# Patient Record
Sex: FEMALE | Race: WHITE | NOT HISPANIC OR LATINO | Employment: FULL TIME | URBAN - METROPOLITAN AREA
[De-identification: names, ages, dates, MRNs, and addresses within clinical notes are randomized per-mention and may not be internally consistent; named-entity substitution may affect disease eponyms.]

---

## 2017-11-02 ENCOUNTER — ALLSCRIPTS OFFICE VISIT (OUTPATIENT)
Dept: OTHER | Facility: OTHER | Age: 57
End: 2017-11-02

## 2017-11-23 NOTE — PROGRESS NOTES
Assessment    1  Encounter for gynecological examination without abnormal finding (V72 31) (Z01 419)    Plan  Encounter for gynecological examination without abnormal finding    · Follow-up visit in 1 year Evaluation and Treatment  Follow-up  Status: Complete  Done:02Nov2017   Ordered; For: Encounter for gynecological examination without abnormal finding; Ordered By: Mary Mariano Performed:  Due: 56ZQA0962; Last Updated By: Kylee Shelby; 11/2/2017 7:05:00 PM  Encounter for screening mammogram for malignant neoplasm of breast    · * MAMMO SCREENING BILATERAL W CAD; Status:Active; Requested for:30Nov2017;    Perform:[facility]; Marla Crenshawkj; Last Updated Emily Vang; 11/2/2017 6:48:59 PM;Ordered;for screening mammogram for malignant neoplasm of breast; Ordered By:Declan Matute; Discussion/Summary  healthy adult female the risks and benefits of cervical cancer screening were discussed cervical cancer screening is current Breast cancer screening: the risks and benefits of breast cancer screening were discussed, monthly self breast exam was advised and mammogram has been ordered  Colorectal cancer screening: the risks and benefits of colorectal cancer screening were discussed and colonoscopy has been ordered  Advice and education were given regarding weight bearing exercise, calcium supplements and vitamin D supplements  Chief Complaint  Pt is here for her yearly exam, she has no complaints  History of Present Illness  HPI: 59year old white female here for yearly exam, no complaints  She is postmenopausal and has had no bleeding  She is not sexually active as she has no partner  She has never had a colonoscopy and doesn't plan to have one either; she plans to do the Cologard test  She has not seen her PCP in years and I recommended she see them for a wellness visit and also for the Cologard test   has had a rough few weeks   Her mother was hospitalized for two weeks at which time she found out that she had colitis  Her nephew  of an overdose while her mother was in the hospital    GYN HM, Adult Female [facility]: The patient is being seen for a health maintenance evaluation  The last health maintenance visit was 1 year(s) ago  General Health: The patient's health since the last visit is described as good  Lifestyle:  She consumes a diverse and healthy diet  -- She does not have any weight concerns  -- She does not exercise regularly  -- She uses tobacco -- She denies alcohol use  -- She denies drug use  Reproductive health: the patient is postmenopausal--   she is not sexually active  -- pregnancy history: G 2P 2,-- 2    Screening: Cervical cancer screening includes a pap smear performed 2015,neg  -- and-- human papilloma virus screening performed 2015,neg  Breast cancer screening includes a mammogram performed 2016, WNL  Colorectal cancer screening includes no previous colonoscopy  Active Problems    1  Encounter for gynecological examination without abnormal finding (V72 31) (Z01 419)   2  Encounter for screening colonoscopy (V76 51) (Z12 11)   3  Encounter for screening mammogram for malignant neoplasm of breast (V76 12) (Z12 31)   4  Postmenopausal disorder (627 9) (N95 9)   5  Screening for HPV (human papillomavirus) (V73 81) (Z11 51)    Past Medical History   · History of Age At First Period 13 Years Old (Menarche)   · History of Anxiety (300 00) (F41 9)   · History of Asymptomatic menopausal state (V49 81) (Z78 0)   · History of Depression (311) (F32 9)   · Normal delivery (650) (O80,Z37  9)    Surgical History   · History of  Section    Family History  Mother    · Family history of hypertension (V17 49) (Z82 49)  Father    · Family history of hypertension (V17 49) (Z82 49)  Maternal Aunt    · Family history of Breast Cancer (V16 3)   · Family history of Pulmonary Embolism  Family History    · Family history of Arthritis (V17 7)   · Family history of Heart Disease (V17 49)   · Family history of Hypertension (V17 49)    Social History     · Denied: History of Alcohol Use (History)   · Current Smoker (305 1)   · Denied: History of Currently sexually active   · Daily Coffee Consumption (2  Cups/Day)   · Denied: History of Drug Use   · Denied: History of Exercises regularly    Current Meds   1  Venlafaxine HCl  MG Oral Capsule Extended Release 24 Hour; Therapy: 64SVN2639 to (Last Rx:11Nov2011)  Requested for: 60KZD1536 Ordered    Allergies  1  No Known Drug Allergies    Vitals   Recorded: 31PWN9800 68:26MJ   Systolic 078, LUE, Sitting   Diastolic 74, LUE, Sitting   Height 5 ft 4 in   Weight 154 lb    BMI Calculated 26 43   BSA Calculated 1 75   LMP        Physical Exam   Constitutional  General appearance: No acute distress, well appearing and well nourished  Neck  Neck: Normal, supple, trachea midline, no masses  Genitourinary  External genitalia: Normal and no lesions appreciated  Vagina: Normal, no lesions or dryness appreciated  Urethra: Normal    Urethral meatus: Normal    Bladder: Normal, soft, non-tender and no prolapse or masses appreciated  Cervix: Normal, no palpable masses  Uterus: Normal, non-tender, not enlarged, and no palpable masses  Adnexa/parametria: Normal, non-tender and no fullness or masses appreciated  Chest  Breasts: Normal and no dimpling or skin changes noted  Abdomen  Abdomen: Normal, non-tender, and no organomegaly noted  Lymphatic  Palpation of lymph nodes in neck, axillae, groin and/or other locations: No lymphadenopathy or masses noted         Future Appointments    Date/Time Provider Specialty Site   11/07/2018 08:00 AM Estrella Gonzalez [facility] Obstetrics/Gynecology [facility]       Signatures   Electronically signed by : Earnestine Edwards [facility]; Nov 2 2017  6:57PM EST                       (Author)    Electronically signed by : NEETU Manning ; Nov 22 2017 10:26AM EST                       (Author)

## 2017-11-30 DIAGNOSIS — Z12.31 ENCOUNTER FOR SCREENING MAMMOGRAM FOR MALIGNANT NEOPLASM OF BREAST: ICD-10-CM

## 2017-12-27 ENCOUNTER — HOSPITAL ENCOUNTER (OUTPATIENT)
Dept: RADIOLOGY | Age: 57
Discharge: HOME | End: 2017-12-27
Payer: COMMERCIAL

## 2017-12-27 DIAGNOSIS — Z12.31 ENCOUNTER FOR SCREENING MAMMOGRAM FOR MALIGNANT NEOPLASM OF BREAST: ICD-10-CM

## 2017-12-27 PROCEDURE — G0202 SCR MAMMO BI INCL CAD: HCPCS

## 2018-01-13 VITALS
SYSTOLIC BLOOD PRESSURE: 120 MMHG | BODY MASS INDEX: 26.29 KG/M2 | DIASTOLIC BLOOD PRESSURE: 74 MMHG | WEIGHT: 154 LBS | HEIGHT: 64 IN

## 2018-11-07 ENCOUNTER — ANNUAL EXAM (OUTPATIENT)
Dept: OBGYN CLINIC | Facility: CLINIC | Age: 58
End: 2018-11-07
Payer: COMMERCIAL

## 2018-11-07 VITALS
DIASTOLIC BLOOD PRESSURE: 88 MMHG | SYSTOLIC BLOOD PRESSURE: 144 MMHG | WEIGHT: 160 LBS | HEIGHT: 64 IN | BODY MASS INDEX: 27.31 KG/M2

## 2018-11-07 DIAGNOSIS — Z01.419 ENCNTR FOR GYN EXAM (GENERAL) (ROUTINE) W/O ABN FINDINGS: ICD-10-CM

## 2018-11-07 DIAGNOSIS — Z12.31 ENCOUNTER FOR SCREENING MAMMOGRAM FOR MALIGNANT NEOPLASM OF BREAST: ICD-10-CM

## 2018-11-07 PROCEDURE — 99396 PREV VISIT EST AGE 40-64: CPT | Performed by: PHYSICIAN ASSISTANT

## 2018-11-07 RX ORDER — CLONAZEPAM 0.5 MG/1
0.5 TABLET ORAL DAILY PRN
COMMUNITY

## 2018-11-07 RX ORDER — VENLAFAXINE HYDROCHLORIDE 75 MG/1
75 CAPSULE, EXTENDED RELEASE ORAL
COMMUNITY

## 2018-11-07 NOTE — PROGRESS NOTES
Carito Jimenez   1960    CC:  Yearly exam    S:  59 y o  female here for yearly exam  She is postmenopausal and has had no vaginal bleeding  She denies vaginal discharge, itching, odor or dryness  She is not sexually active  She still struggles emotionally with the death of her son in 2012 in a motorcycle accident [place] [address]  She is struggling with "all over body yeast" and is seeing her PCP for this; she says that her dermatologist no longer accepts her insurance and she was given the name of Dr Brandon Diane as another option  Last Pap 7/13/15 neg/neg  Last Mammo 12/27/17 neg  Last Colonoscopy never - recommended      Current Outpatient Prescriptions:     clonazePAM (KlonoPIN) 0 5 mg tablet, Take 0 5 mg by mouth daily as needed, Disp: , Rfl:     venlafaxine (EFFEXOR-XR) 75 mg 24 hr capsule, Take 75 mg by mouth, Disp: , Rfl:   Social History     Social History    Marital status: Single     Spouse name: N/A    Number of children: N/A    Years of education: N/A     Occupational History    Not on file  Social History Main Topics    Smoking status: Current Every Day Smoker    Smokeless tobacco: Never Used    Alcohol use No      Comment: (history)    Drug use: No    Sexual activity: No     Other Topics Concern    Not on file     Social History Narrative    Caffeine use- 2 cups of coffee per day    Denied exercising regularly             Family History   Problem Relation Age of Onset    Hypertension Mother     Hypertension Father     Arthritis Family     Heart disease Family     Hypertension Family     Breast cancer Maternal Aunt     Pulmonary embolism Maternal Aunt      Past Medical History:   Diagnosis Date    Anxiety     Depression         O:  Blood pressure 144/88, height 5' 4" (1 626 m), weight 72 6 kg (160 lb)      Patient appears well and is not in distress  Neck is supple without masses  Breasts are symmetrical without mass, tenderness, nipple discharge, skin changes or adenopathy  Abdomen is soft and nontender without masses  External genitals are normal without lesions or rashes  Vagina is normal without discharge or bleeding  Cervix is normal without discharge or lesion  Uterus is normal, mobile, nontender without palpable mass  Adnexa are normal, nontender, without palpable mass  A:  Yearly exam      P:   Pap 2020   Mammo slip entered   Colonoscopy recommended   RTO one year for yearly exam or sooner as needed

## 2024-02-14 ENCOUNTER — OFFICE VISIT (OUTPATIENT)
Dept: FAMILY MEDICINE CLINIC | Facility: CLINIC | Age: 64
End: 2024-02-14
Payer: COMMERCIAL

## 2024-02-14 VITALS
SYSTOLIC BLOOD PRESSURE: 140 MMHG | DIASTOLIC BLOOD PRESSURE: 90 MMHG | BODY MASS INDEX: 29.53 KG/M2 | WEIGHT: 173 LBS | HEART RATE: 112 BPM | HEIGHT: 64 IN | RESPIRATION RATE: 16 BRPM | OXYGEN SATURATION: 98 % | TEMPERATURE: 98.4 F

## 2024-02-14 DIAGNOSIS — Z12.11 COLON CANCER SCREENING: ICD-10-CM

## 2024-02-14 DIAGNOSIS — F33.2 SEVERE RECURRENT MAJOR DEPRESSION WITHOUT PSYCHOTIC FEATURES (HCC): ICD-10-CM

## 2024-02-14 DIAGNOSIS — Z00.00 ANNUAL PHYSICAL EXAM: ICD-10-CM

## 2024-02-14 DIAGNOSIS — L30.8 OTHER ECZEMA: ICD-10-CM

## 2024-02-14 DIAGNOSIS — M25.561 ACUTE PAIN OF RIGHT KNEE: Primary | ICD-10-CM

## 2024-02-14 DIAGNOSIS — Z53.20 LUNG CANCER SCREENING DECLINED BY PATIENT: ICD-10-CM

## 2024-02-14 DIAGNOSIS — Z12.4 SCREENING FOR CERVICAL CANCER: ICD-10-CM

## 2024-02-14 DIAGNOSIS — Z11.59 NEED FOR HEPATITIS C SCREENING TEST: ICD-10-CM

## 2024-02-14 DIAGNOSIS — Z13.820 OSTEOPOROSIS SCREENING: ICD-10-CM

## 2024-02-14 DIAGNOSIS — M72.0 DUPUYTREN CONTRACTURE: ICD-10-CM

## 2024-02-14 DIAGNOSIS — Z12.31 ENCOUNTER FOR SCREENING MAMMOGRAM FOR BREAST CANCER: ICD-10-CM

## 2024-02-14 DIAGNOSIS — F17.200 TOBACCO USE DISORDER: ICD-10-CM

## 2024-02-14 DIAGNOSIS — Z11.4 SCREENING FOR HIV (HUMAN IMMUNODEFICIENCY VIRUS): ICD-10-CM

## 2024-02-14 PROCEDURE — 99204 OFFICE O/P NEW MOD 45 MIN: CPT | Performed by: FAMILY MEDICINE

## 2024-02-14 PROCEDURE — 99386 PREV VISIT NEW AGE 40-64: CPT | Performed by: FAMILY MEDICINE

## 2024-02-14 RX ORDER — CEPHALEXIN 500 MG/1
500 CAPSULE ORAL EVERY 8 HOURS SCHEDULED
Qty: 21 CAPSULE | Refills: 0 | Status: SHIPPED | OUTPATIENT
Start: 2024-02-14 | End: 2024-02-21

## 2024-02-14 RX ORDER — FLUOCINOLONE ACETONIDE 0.1 MG/ML
SOLUTION TOPICAL 2 TIMES DAILY
Qty: 90 ML | Refills: 0 | Status: SHIPPED | OUTPATIENT
Start: 2024-02-14

## 2024-02-14 NOTE — ASSESSMENT & PLAN NOTE
Used to see Dr. Sanchez for over 30 years  And she is retiring   I will manage her meds moving forward as she has been doing well with them

## 2024-02-14 NOTE — PROGRESS NOTES
ADULT ANNUAL PHYSICAL  [facility]    NAME: Sharon Olsen  AGE: 63 y.o. 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 Sanchez for over 30 years  And she is retiring   I will manage her meds moving forward as she has been doing well with them            Tobacco use disorder     Not ready to quit yet  Smoking cessation done today          Lung cancer screening declined by patient   Other Visit Diagnoses     Acute pain of right knee    -  Primary    started 2 weeks ago after falling over ice   took ibuprofen which took the edge off    Relevant Orders    XR knee 4+ vw right injury    Need for hepatitis C screening test        Relevant Orders    Hepatitis C antibody    Screening for HIV (human immunodeficiency virus)        Relevant Orders    HIV 1/2 AG/AB w Reflex [facility] for 2 yr old and above    Screening for cervical cancer        Relevant Orders    Ambulatory Referral to Obstetrics / Gynecology    Encounter for screening mammogram for breast cancer        Relevant Orders    Mammo screening bilateral w 3d & cad    Osteoporosis screening        Relevant Orders    DXA bone density spine hip and pelvis    Colon cancer screening        Relevant Orders    Cologuard    Annual physical exam        Relevant Orders    CBC and differential    Comprehensive metabolic panel    Lipid panel    TSH, 3rd generation with Free T4 reflex    Other eczema        Relevant Medications    fluocinolone (SYNALAR) 0.01 % external solution    cephalexin (KEFLEX) 500 mg capsule    Dupuytren contracture        Relevant Orders    Ambulatory referral to Orthopedic Surgery          Immunizations and preventive care screenings were discussed with patient today. Appropriate education was printed on patient's after visit  summary.    Counseling:  Alcohol/drug use: discussed moderation in alcohol intake, the recommendations for healthy alcohol use, and avoidance of illicit drug use.  Dental Health: discussed importance of regular tooth brushing, flossing, and dental visits.  Injury prevention: discussed safety/seat belts, safety helmets, smoke detectors, carbon dioxide detectors, and smoking near bedding or upholstery.  Sexual health: discussed sexually transmitted diseases, partner selection, use of condoms, avoidance of unintended pregnancy, and contraceptive alternatives.  Exercise: the importance of regular exercise/physical activity was discussed. Recommend exercise 3-5 times per week for at least 30 minutes.       Tobacco Cessation Counseling: Tobacco cessation counseling was provided. The patient is sincerely urged to quit consumption of tobacco. She is not ready to quit tobacco. Medication options and side effects of medication discussed. Patient refused medication.         No follow-ups on file.     Chief Complaint:     Chief Complaint   Patient presents with   • New Patient Visit     Patient being seen for New Patient visit-establish care   • Knee Pain     Patient is being seen for R knee x 2 weeks ago. Patient states she fell on ice.    • Earache     Patient is being seen for bilateral ear pain x a couple year. Patient notes itchy, red ears.       History of Present Illness:     Adult Annual Physical   Patient here for a comprehensive physical exam. New patient . The patient reports   New Patient Visit (Patient being seen for New Patient visit-establish care)  Knee Pain (Patient is being seen for R knee x 2 weeks ago. Patient states she fell on ice 2 weeks ago)  Earache (Patient is being seen for bilateral ear pain x a couple year. Patient notes itchy, red ears. ).       Hard to stretch  right knee all the way since the fall . Moving in different directions make it worse. Walking is okay   Her psychiatrist of over 30 years  Is retiring   Works at [employer] as      Diet and Physical Activity  Diet/Nutrition: well balanced diet and consuming 3-5 servings of fruits/vegetables daily.   Exercise: walking.      Depression Screening  PHQ-2/9 Depression Screening    Little interest or pleasure in doing things: 0 - not at all  Feeling down, depressed, or hopeless: 0 - not at all  PHQ-2 Score: 0  PHQ-2 Interpretation: Negative depression screen       General Health  Sleep: sleeps well and gets 7-8 hours of sleep on average.   Hearing: normal - bilateral.  Vision: goes for regular eye exams.   Dental: regular dental visits and brushes teeth twice daily.       /GYN Health  Follows with gynecology? yes   Patient is: postmenopausal  Last menstrual period: 20 years ago   Contraceptive method:  none .    Advanced Care Planning  Do you have an advanced directive? no  Do you have a durable medical power of ? no  ACP document given to the patient? no     Review of Systems:     Review of Systems   Constitutional: Negative.  Negative for fever.   HENT:  Positive for ear pain. Negative for facial swelling, hearing loss, nosebleeds, postnasal drip, sinus pain, tinnitus, trouble swallowing and voice change.    Eyes: Negative.    Respiratory: Negative.     Cardiovascular: Negative.    Gastrointestinal: Negative.    Endocrine: Negative.    Genitourinary: Negative.    Musculoskeletal:  Positive for arthralgias.   Skin:  Positive for color change and rash.   Allergic/Immunologic: Negative.    Neurological: Negative.    Hematological: Negative.    Psychiatric/Behavioral: Negative.        Past Medical History:     Past Medical History:   Diagnosis Date   • Anxiety    • Depression       Past Surgical History:     Past Surgical History:   Procedure Laterality Date   •  SECTION  1987      Social History:     Social History     Socioeconomic History   • Marital status: Single     Spouse name: None   • Number of children: None    • Years of education: None   • Highest education level: None   Occupational History   • None   Tobacco Use   • Smoking status: Some Days     Current packs/day: 0.25     Average packs/day: 0.3 packs/day for 25.1 years (6.3 ttl pk-yrs)     Types: Cigarettes     Start date: 1999   • Smokeless tobacco: Never   Vaping Use   • Vaping status: Never Used   Substance and Sexual Activity   • Alcohol use: Yes     Comment: social   • Drug use: No   • Sexual activity: Never   Other Topics Concern   • None   Social History Narrative    Caffeine use- 2 cups of coffee per day    Denied exercising regularly         Social Determinants of Health     Financial Resource Strain: Not on file   Food Insecurity: Not on file   Transportation Needs: Not on file   Physical Activity: Not on file   Stress: Not on file   Social Connections: Not on file   Intimate Partner Violence: Not on file   Housing Stability: Not on file      Family History:     Family History   Problem Relation Age of Onset   • Hypertension Mother    • Hypertension Father    • No Known Problems Sister    • No Known Problems Sister    • Heart disease Brother    • Clotting disorder Maternal Grandmother    • No Known Problems Maternal Grandfather    • No Known Problems Paternal Grandmother    • No Known Problems Paternal Grandfather    • Breast cancer Maternal Aunt    • Pulmonary embolism Maternal Aunt    • Arthritis Family    • Heart disease Family    • Hypertension Family       Current Medications:     Current Outpatient Medications   Medication Sig Dispense Refill   • cephalexin (KEFLEX) 500 mg capsule Take 1 capsule (500 mg total) by mouth every 8 (eight) hours for 7 days 21 capsule 0   • clonazePAM (KlonoPIN) 0.5 mg tablet Take 0.5 mg by mouth daily as needed     • fluocinolone (SYNALAR) 0.01 % external solution Apply topically 2 (two) times a day 90 mL 0   • venlafaxine (EFFEXOR-XR) 75 mg 24 hr capsule Take 75 mg by mouth       No current facility-administered  "medications for this visit.      Allergies:     No Known Allergies   Physical Exam:     /90 (BP Location: Left arm, Patient Position: Sitting, Cuff Size: Standard)   Pulse (!) 112   Temp 98.4 °F (36.9 °C) (Tympanic)   Resp 16   Ht 5' 4.02\" (1.626 m)   Wt 78.5 kg (173 lb)   SpO2 98%   BMI 29.68 kg/m²     Physical Exam  Vitals and nursing note reviewed.   Constitutional:       Appearance: Normal appearance.   HENT:      Head: Normocephalic.      Right Ear: Tympanic membrane normal.      Left Ear: Tympanic membrane normal.      Nose: Nose normal.      Mouth/Throat:      Mouth: Mucous membranes are moist.   Cardiovascular:      Rate and Rhythm: Normal rate and regular rhythm.   Pulmonary:      Effort: Pulmonary effort is normal.      Breath sounds: Normal breath sounds.   Abdominal:      General: Abdomen is flat.   Musculoskeletal:         General: Tenderness present.      Comments: Right medial knee   Skin:     General: Skin is warm.      Findings: Erythema and rash present.      Comments: Left posterior ear   Neurological:      General: No focal deficit present.      Mental Status: She is alert and oriented to person, place, and time.   Psychiatric:         Mood and Affect: Mood normal.         Behavior: Behavior normal.                Rosalie Connolly MD  [facility]    "

## 2024-02-19 ENCOUNTER — HOSPITAL ENCOUNTER (OUTPATIENT)
Dept: RADIOLOGY | Age: 64
Discharge: HOME | End: 2024-02-19
Payer: COMMERCIAL

## 2024-02-19 VITALS — HEIGHT: 64 IN | BODY MASS INDEX: 29.53 KG/M2 | WEIGHT: 173 LBS

## 2024-02-19 DIAGNOSIS — Z12.31 ENCOUNTER FOR SCREENING MAMMOGRAM FOR BREAST CANCER: ICD-10-CM

## 2024-02-19 PROCEDURE — 77063 BREAST TOMOSYNTHESIS BI: CPT

## 2024-02-19 PROCEDURE — 77067 SCR MAMMO BI INCL CAD: CPT

## 2024-02-20 ENCOUNTER — HOSPITAL ENCOUNTER (OUTPATIENT)
Dept: RADIOLOGY | Facility: HOSPITAL | Age: 64
Discharge: HOME | End: 2024-02-20
Payer: COMMERCIAL

## 2024-02-20 DIAGNOSIS — M25.561 ACUTE PAIN OF RIGHT KNEE: ICD-10-CM

## 2024-02-20 PROCEDURE — 73564 X-RAY EXAM KNEE 4 OR MORE: CPT

## 2024-02-21 ENCOUNTER — TELEPHONE (OUTPATIENT)
Dept: FAMILY MEDICINE CLINIC | Facility: CLINIC | Age: 64
End: 2024-02-21

## 2024-02-21 NOTE — TELEPHONE ENCOUNTER
----- Message from Rosalie Connolly MD sent at 2/21/2024  7:36 AM EST -----  Normal right knee X ray    Dr connolly

## 2024-03-01 LAB
ALBUMIN SERPL-MCNC: 4.4 G/DL (ref 3.6–5.1)
ALBUMIN/GLOB SERPL: 1.8 (CALC) (ref 1–2.5)
ALP SERPL-CCNC: 110 U/L (ref 37–153)
ALT SERPL-CCNC: 12 U/L (ref 6–29)
AST SERPL-CCNC: 14 U/L (ref 10–35)
BASOPHILS # BLD AUTO: 139 CELLS/UL (ref 0–200)
BASOPHILS NFR BLD AUTO: 2.4 %
BILIRUB SERPL-MCNC: 0.7 MG/DL (ref 0.2–1.2)
BUN SERPL-MCNC: 11 MG/DL (ref 7–25)
BUN/CREAT SERPL: NORMAL (CALC) (ref 6–22)
CALCIUM SERPL-MCNC: 9.7 MG/DL (ref 8.6–10.4)
CHLORIDE SERPL-SCNC: 105 MMOL/L (ref 98–110)
CHOLEST SERPL-MCNC: 273 MG/DL
CHOLEST/HDLC SERPL: 4.8 (CALC)
CO2 SERPL-SCNC: 29 MMOL/L (ref 20–32)
CREAT SERPL-MCNC: 0.74 MG/DL (ref 0.5–1.05)
EOSINOPHIL # BLD AUTO: 354 CELLS/UL (ref 15–500)
EOSINOPHIL NFR BLD AUTO: 6.1 %
ERYTHROCYTE [DISTWIDTH] IN BLOOD BY AUTOMATED COUNT: 12.7 % (ref 11–15)
GFR/BSA.PRED SERPLBLD CYS-BASED-ARV: 90 ML/MIN/1.73M2
GLOBULIN SER CALC-MCNC: 2.5 G/DL (CALC) (ref 1.9–3.7)
GLUCOSE SERPL-MCNC: 98 MG/DL (ref 65–99)
HCT VFR BLD AUTO: 43.1 % (ref 35–45)
HCV AB SERPL QL IA: NORMAL
HDLC SERPL-MCNC: 57 MG/DL
HGB BLD-MCNC: 14.7 G/DL (ref 11.7–15.5)
HIV 1+2 AB+HIV1 P24 AG SERPL QL IA: NORMAL
LDLC SERPL CALC-MCNC: 184 MG/DL (CALC)
LYMPHOCYTES # BLD AUTO: 1218 CELLS/UL (ref 850–3900)
LYMPHOCYTES NFR BLD AUTO: 21 %
MCH RBC QN AUTO: 30.7 PG (ref 27–33)
MCHC RBC AUTO-ENTMCNC: 34.1 G/DL (ref 32–36)
MCV RBC AUTO: 90 FL (ref 80–100)
MONOCYTES # BLD AUTO: 394 CELLS/UL (ref 200–950)
MONOCYTES NFR BLD AUTO: 6.8 %
NEUTROPHILS # BLD AUTO: 3695 CELLS/UL (ref 1500–7800)
NEUTROPHILS NFR BLD AUTO: 63.7 %
NONHDLC SERPL-MCNC: 216 MG/DL (CALC)
PLATELET # BLD AUTO: 284 THOUSAND/UL (ref 140–400)
PMV BLD REES-ECKER: 11.2 FL (ref 7.5–12.5)
POTASSIUM SERPL-SCNC: 4.5 MMOL/L (ref 3.5–5.3)
PROT SERPL-MCNC: 6.9 G/DL (ref 6.1–8.1)
RBC # BLD AUTO: 4.79 MILLION/UL (ref 3.8–5.1)
SODIUM SERPL-SCNC: 139 MMOL/L (ref 135–146)
TRIGL SERPL-MCNC: 175 MG/DL
TSH SERPL-ACNC: 1.06 MIU/L (ref 0.4–4.5)
WBC # BLD AUTO: 5.8 THOUSAND/UL (ref 3.8–10.8)

## 2024-03-05 ENCOUNTER — HOSPITAL ENCOUNTER (OUTPATIENT)
Dept: RADIOLOGY | Facility: IMAGING CENTER | Age: 64
Discharge: HOME | End: 2024-03-05
Payer: COMMERCIAL

## 2024-03-05 DIAGNOSIS — Z13.820 OSTEOPOROSIS SCREENING: ICD-10-CM

## 2024-03-05 PROCEDURE — 77080 DXA BONE DENSITY AXIAL: CPT

## 2024-03-19 ENCOUNTER — OFFICE VISIT (OUTPATIENT)
Dept: OBGYN CLINIC | Facility: CLINIC | Age: 64
End: 2024-03-19
Payer: COMMERCIAL

## 2024-03-19 VITALS — BODY MASS INDEX: 29.53 KG/M2 | HEIGHT: 64 IN | WEIGHT: 173 LBS

## 2024-03-19 DIAGNOSIS — M72.0 DUPUYTREN'S DISEASE: Primary | ICD-10-CM

## 2024-03-19 PROCEDURE — 99203 OFFICE O/P NEW LOW 30 MIN: CPT | Performed by: STUDENT IN AN ORGANIZED HEALTH CARE EDUCATION/TRAINING PROGRAM

## 2024-03-19 NOTE — PROGRESS NOTES
ORTHOPAEDIC HAND, WRIST, AND ELBOW OFFICE  VISIT      ASSESSMENT/PLAN:      Diagnoses and all orders for this visit:    Dupuytren's disease  -     Ambulatory Referral to PT/OT Hand Therapy; Future          63 y.o. female with bilateral hand dupuytren's diease   Treatment options and expected outcomes were discussed.  The patient verbalized understanding of exam findings and treatment plan.   The patient was given the opportunity to ask questions.  Questions were answered to the patient's satisfaction.  Discussed with the patient that typically we can consider treatment options such as Xiaflex or surgery once the patient has a 30 degree contracture at MCP joint or any degree of contracture of PIP joint  Patient stated she is not very symptomatic from PIP joint and would like to continue noninvasive treatment  She does not have any contractures on the right.  Also discussed we can consider a referral to formal therapy for custom bracing.   The patient decided to move forward with therapy and custom bracing.  A referral was provided for a left small finger PIP splint she can wear at night.  The patient was instructed to monitor her contractures and if these worsen to follow up in the office.  She may follow up with me as needed.      Follow Up:  PRN       To Do Next Visit:         Discussions:  Dupuytren's Disease:  The anatomy and physiology of Dupuytren's disease were discussed with the patient today in the office.  Increased collagen formation (similar to scar tissue formation but without injury) within the interval between the skin volarly and the flexor tendons dorsally can result in pit formation, nodular formation, or eventual cord formation.  These pathologic cords can cause contracture at either the metacarpophalangeal joint, proximal interphalangeal joint, or both.  As the cords progress towards the proximal interphalangeal joint, the neurovascular structures of the finger may become involved within the  disease process.  While this is a genetic condition with variable penetrance, repetitive micro trauma may trigger the development of cord formation and thus contracture.  Conservative treatment options including therapy to maintain joint mobility and tabletop testing were discussed.  Other treatments include Xiaflex (collagenase) injection and surgical excision of abnormal cords.       Kameron Ventura MD  Attending, Orthopaedic Surgery  Hand, Wrist, and Elbow Surgery  [facility]    ______________________________________________________________________________________________    CHIEF COMPLAINT:  Chief Complaint   Patient presents with   • Left Hand - Pain       SUBJECTIVE:  Patient is a 63 y.o. RHD female who presents today for evaluation and treatment of bilateral hand Dupuytren's disease. The patient notes cords to both of her palms. She states the left is worse than her right. She states this has been ongoing for the past 5 years. She denies any pain. She states this does not interfere with ADLs. The patient is referred by Rosalie Connolly MD.      Occupation:  [employer]      I have personally reviewed all the relevant PMH, PSH, SH, FH, Medications and allergies      PAST MEDICAL HISTORY:  Past Medical History:   Diagnosis Date   • Anxiety    • Depression        PAST SURGICAL HISTORY:  Past Surgical History:   Procedure Laterality Date   • BREAST EXCISIONAL BIOPSY Left     benign   •  SECTION  1987       FAMILY HISTORY:  Family History   Problem Relation Age of Onset   • Hypertension Mother    • Hypertension Father    • Prostate cancer Father 50   • Heart attack Father 52   • No Known Problems Sister    • No Known Problems Sister    • No Known Problems Daughter    • Clotting disorder Maternal Grandmother    • No Known Problems Maternal Grandfather    • No Known Problems Paternal Grandmother    • No Known Problems Paternal Grandfather    • Heart disease  "Brother    • Cancer Brother         spleen   • Breast cancer Maternal Aunt 60   • Pulmonary embolism Maternal Aunt    • No Known Problems Maternal Aunt    • No Known Problems Maternal Aunt    • No Known Problems Maternal Aunt    • Arthritis Family    • Heart disease Family    • Hypertension Family    • Heart disease Paternal Aunt    • No Known Problems Paternal Aunt    • No Known Problems Paternal Aunt        SOCIAL HISTORY:  Social History     Tobacco Use   • Smoking status: Some Days     Current packs/day: 0.25     Average packs/day: 0.3 packs/day for 25.2 years (6.3 ttl pk-yrs)     Types: Cigarettes     Start date: 1999   • Smokeless tobacco: Never   Vaping Use   • Vaping status: Never Used   Substance Use Topics   • Alcohol use: Yes     Comment: social   • Drug use: No       MEDICATIONS:    Current Outpatient Medications:   •  clonazePAM (KlonoPIN) 0.5 mg tablet, Take 0.5 mg by mouth daily as needed, Disp: , Rfl:   •  fluocinolone (SYNALAR) 0.01 % external solution, Apply topically 2 (two) times a day, Disp: 90 mL, Rfl: 0  •  venlafaxine (EFFEXOR-XR) 75 mg 24 hr capsule, Take 75 mg by mouth, Disp: , Rfl:     ALLERGIES:  No Known Allergies        REVIEW OF SYSTEMS:  Musculoskeletal:        As noted in HPI.   All other systems reviewed and are negative.    VITALS:  There were no vitals filed for this visit.    LABS:  HgA1c: No results found for: \"HGBA1C\"  BMP:   Lab Results   Component Value Date    CALCIUM 9.7 03/01/2024    K 4.5 03/01/2024    CO2 29 03/01/2024     03/01/2024    BUN 11 03/01/2024    CREATININE 0.74 03/01/2024       _____________________________________________________  PHYSICAL EXAMINATION:  General: Well developed and well nourished, alert & oriented x 3, appears comfortable  Psychiatric: Normal  HEENT: Normocephalic, Atraumatic Trachea Midline, No torticollis  Pulmonary: No audible wheezing or respiratory distress   Abdomen/GI: Non tender, non distended   Cardiovascular: No pitting edema, " 2+ radial pulse   Skin: No masses, erythema, lacerations, fluctation, ulcerations  Neurovascular: Sensation Intact to the Median, Ulnar, Radial Nerve, Motor Intact to the Median, Ulnar, Radial Nerve, and Pulses Intact  Musculoskeletal: Normal, except as noted in detailed exam and in HPI.      MUSCULOSKELETAL EXAMINATION:  Right hand  Dupuytren's cords without contractures    Left hand  Long and ring finger dupuytren's cords in the palm  Long MCP 5 shy full extension   Ring MCP 10 shy full extension  Small finger PIP 15 shy   ___________________________________________________  STUDIES REVIEWED:  PCP note reviewed   CMP from 3/1/24 reviewed        PROCEDURES PERFORMED:  Procedures  No Procedures performed today    _____________________________________________________      Scribe Attestation    I,:  Gayathri Morgan MA am acting as a scribe while in the presence of the attending physician.:       I,:  Kameron Ventura MD personally performed the services described in this documentation    as scribed in my presence.:

## 2024-03-29 ENCOUNTER — OFFICE VISIT (OUTPATIENT)
Dept: OBGYN CLINIC | Facility: CLINIC | Age: 64
End: 2024-03-29
Payer: COMMERCIAL

## 2024-03-29 VITALS
BODY MASS INDEX: 29.4 KG/M2 | SYSTOLIC BLOOD PRESSURE: 140 MMHG | HEIGHT: 64 IN | WEIGHT: 172.2 LBS | DIASTOLIC BLOOD PRESSURE: 82 MMHG

## 2024-03-29 DIAGNOSIS — Z01.419 WELL WOMAN EXAM WITH ROUTINE GYNECOLOGICAL EXAM: Primary | ICD-10-CM

## 2024-03-29 DIAGNOSIS — Z12.4 ENCOUNTER FOR SCREENING FOR MALIGNANT NEOPLASM OF CERVIX: ICD-10-CM

## 2024-03-29 DIAGNOSIS — Z12.4 SCREENING FOR CERVICAL CANCER: ICD-10-CM

## 2024-03-29 DIAGNOSIS — Z12.31 ENCOUNTER FOR SCREENING MAMMOGRAM FOR MALIGNANT NEOPLASM OF BREAST: ICD-10-CM

## 2024-03-29 DIAGNOSIS — Z11.51 SCREENING FOR HPV (HUMAN PAPILLOMAVIRUS): ICD-10-CM

## 2024-03-29 PROCEDURE — G0145 SCR C/V CYTO,THINLAYER,RESCR: HCPCS

## 2024-03-29 PROCEDURE — S0610 ANNUAL GYNECOLOGICAL EXAMINA: HCPCS

## 2024-03-29 PROCEDURE — G0476 HPV COMBO ASSAY CA SCREEN: HCPCS

## 2024-03-29 NOTE — PROGRESS NOTES
ASSESSMENT & PLAN:   Diagnoses and all orders for this visit:    Well woman exam with routine gynecological exam  -     Liquid-based pap, screening    Encounter for screening for malignant neoplasm of cervix  -     Liquid-based pap, screening    Screening for HPV (human papillomavirus)  -     Liquid-based pap, screening    Encounter for screening mammogram for malignant neoplasm of breast  -     Mammo screening bilateral w 3d & cad; Future    Screening for cervical cancer  -     Ambulatory Referral to Obstetrics / Gynecology      The following were reviewed in today's visit: ASCCP guidelines (Pap screen not indicated after age 65), STD testing breast self exam, mammography screening ordered, STD testing, menopause, exercise, and healthy diet.    Patient to return to office in yearly for annual exam.     All questions have been answered to her satisfaction.    CC:  Annual Gynecologic Examination  Chief Complaint   Patient presents with    Gynecologic Exam     Last pap 2015 neg pap/ neg hpv   Last mammo 2024-Negative   Last DXA 2024 -Low bone density   Cologuard kit ordered on 2024        HPI: Sharon Olsen is a 63 y.o.  who presents for annual gynecologic examination.  She has the following concerns: none.      Health Maintenance:    Exercise: never  Breast exams/breast awareness: yes  Last mammogram: 2024, [facility]  Colorectal cancer screening: N/A, patient had cologuard ordered by PCP on 24  DEXA: 3/5/24    Past Medical History:   Diagnosis Date    Anxiety     Depression        Past Surgical History:   Procedure Laterality Date    BREAST EXCISIONAL BIOPSY Left     benign     SECTION  1987       Past OB/Gyn History:   No LMP recorded. Patient is postmenopausal.    Patient is menopausal. Since 41 y/o.  Menopausal symptoms: None  Last Pap: 2015 : no abnormalities  History of abnormal Pap smear: yes - Had a colposcopy ~35 years ago    Patient is not  currently sexually active.     Family History  Family History   Problem Relation Age of Onset    Hypertension Mother     Hypertension Father     Prostate cancer Father 50    Heart attack Father 52    No Known Problems Sister     No Known Problems Sister     No Known Problems Daughter     Clotting disorder Maternal Grandmother     No Known Problems Maternal Grandfather     No Known Problems Paternal Grandmother     No Known Problems Paternal Grandfather     Heart disease Brother     Cancer Brother         spleen    Breast cancer Maternal Aunt 60    Pulmonary embolism Maternal Aunt     No Known Problems Maternal Aunt     No Known Problems Maternal Aunt     No Known Problems Maternal Aunt     Arthritis Family     Heart disease Family     Hypertension Family     Heart disease Paternal Aunt     No Known Problems Paternal Aunt     No Known Problems Paternal Aunt      Family history of uterine or ovarian cancer: no  Family history of breast cancer: yes, maternal aunt   Family history of colon cancer: no    Social History:  Social History     Socioeconomic History    Marital status: Single     Spouse name: Not on file    Number of children: Not on file    Years of education: Not on file    Highest education level: Not on file   Occupational History    Not on file   Tobacco Use    Smoking status: Some Days     Current packs/day: 0.25     Average packs/day: 0.3 packs/day for 25.2 years (6.3 ttl pk-yrs)     Types: Cigarettes     Start date: 1999    Smokeless tobacco: Never   Vaping Use    Vaping status: Never Used   Substance and Sexual Activity    Alcohol use: Yes     Comment: social    Drug use: No    Sexual activity: Not Currently     Partners: Male     Birth control/protection: Post-menopausal   Other Topics Concern    Not on file   Social History Narrative    Caffeine use- 2 cups of coffee per day    Denied exercising regularly         Social Determinants of Health     Financial Resource Strain: Not on file   Food  "Insecurity: Not on file   Transportation Needs: Not on file   Physical Activity: Not on file   Stress: Not on file   Social Connections: Not on file   Intimate Partner Violence: Not on file   Housing Stability: Not on file     Domestic violence screen: negative    Allergies:  No Known Allergies    Medications:    Current Outpatient Medications:     clonazePAM (KlonoPIN) 0.5 mg tablet, Take 0.5 mg by mouth daily as needed, Disp: , Rfl:     fluocinolone (SYNALAR) 0.01 % external solution, Apply topically 2 (two) times a day, Disp: 90 mL, Rfl: 0    venlafaxine (EFFEXOR-XR) 75 mg 24 hr capsule, Take 75 mg by mouth, Disp: , Rfl:     Review of Systems:  Review of Systems   Constitutional:  Negative for chills and fever.   Respiratory:  Negative for shortness of breath.    Cardiovascular:  Negative for chest pain.   Gastrointestinal:  Negative for abdominal pain, constipation and diarrhea.   Genitourinary:  Negative for dysuria, frequency, pelvic pain, vaginal discharge and vaginal pain.   Psychiatric/Behavioral:  Negative for self-injury and suicidal ideas.        Physical Exam:  /82 (BP Location: Right arm, Patient Position: Sitting, Cuff Size: Standard)   Ht 5' 4.02\" (1.626 m)   Wt 78.1 kg (172 lb 3.2 oz)   BMI 29.54 kg/m²    Physical Exam  Constitutional:       Appearance: Normal appearance.   Genitourinary:      Vulva and urethral meatus normal.      No labial fusion noted.      No vaginal erythema or tenderness.        Right Adnexa: not tender.     Left Adnexa: not tender.     No cervical discharge or friability.      Uterus is not tender.   Breasts:     Breasts are symmetrical.      Right: Normal.      Left: Normal.   HENT:      Head: Normocephalic and atraumatic.   Neck:      Thyroid: No thyroid mass or thyroid tenderness.   Cardiovascular:      Rate and Rhythm: Normal rate and regular rhythm.      Heart sounds: Normal heart sounds. No murmur heard.  Pulmonary:      Effort: Pulmonary effort is normal.      " Breath sounds: Normal breath sounds. No wheezing.   Abdominal:      Palpations: Abdomen is soft. There is no mass.      Tenderness: There is no abdominal tenderness.   Lymphadenopathy:      Cervical: No cervical adenopathy.   Neurological:      General: No focal deficit present.      Mental Status: She is alert and oriented to person, place, and time.   Skin:     General: Skin is warm and dry.   Psychiatric:         Mood and Affect: Mood normal.         Behavior: Behavior normal.   Vitals and nursing note reviewed.

## 2024-04-01 LAB
HPV HR 12 DNA CVX QL NAA+PROBE: NEGATIVE
HPV16 DNA CVX QL NAA+PROBE: NEGATIVE
HPV18 DNA CVX QL NAA+PROBE: NEGATIVE

## 2024-04-07 LAB
LAB AP GYN PRIMARY INTERPRETATION: NORMAL
Lab: NORMAL

## 2024-05-28 ENCOUNTER — RA CDI HCC (OUTPATIENT)
Dept: OTHER | Facility: HOSPITAL | Age: 64
End: 2024-05-28

## 2024-06-05 ENCOUNTER — OFFICE VISIT (OUTPATIENT)
Dept: FAMILY MEDICINE CLINIC | Facility: CLINIC | Age: 64
End: 2024-06-05
Payer: COMMERCIAL

## 2024-06-05 VITALS
BODY MASS INDEX: 29.02 KG/M2 | HEIGHT: 64 IN | HEART RATE: 83 BPM | OXYGEN SATURATION: 98 % | WEIGHT: 170 LBS | TEMPERATURE: 97.3 F | SYSTOLIC BLOOD PRESSURE: 130 MMHG | RESPIRATION RATE: 17 BRPM | DIASTOLIC BLOOD PRESSURE: 80 MMHG

## 2024-06-05 DIAGNOSIS — F33.2 SEVERE RECURRENT MAJOR DEPRESSION WITHOUT PSYCHOTIC FEATURES (HCC): Primary | ICD-10-CM

## 2024-06-05 DIAGNOSIS — Z53.20 LUNG CANCER SCREENING DECLINED BY PATIENT: ICD-10-CM

## 2024-06-05 DIAGNOSIS — E78.2 MIXED HYPERLIPIDEMIA: ICD-10-CM

## 2024-06-05 DIAGNOSIS — F17.200 TOBACCO USE DISORDER: ICD-10-CM

## 2024-06-05 DIAGNOSIS — M85.89 OSTEOPENIA OF MULTIPLE SITES: ICD-10-CM

## 2024-06-05 PROCEDURE — 99214 OFFICE O/P EST MOD 30 MIN: CPT | Performed by: FAMILY MEDICINE

## 2024-06-05 RX ORDER — VENLAFAXINE HYDROCHLORIDE 75 MG/1
225 CAPSULE, EXTENDED RELEASE ORAL DAILY
Qty: 270 CAPSULE | Refills: 3 | Status: SHIPPED | OUTPATIENT
Start: 2024-06-05

## 2024-06-05 RX ORDER — ATORVASTATIN CALCIUM 10 MG/1
10 TABLET, FILM COATED ORAL DAILY
Qty: 100 TABLET | Refills: 3 | Status: SHIPPED | OUTPATIENT
Start: 2024-06-05

## 2024-06-05 NOTE — PROGRESS NOTES
Ambulatory Visit  Name: Sharon Olsen      : 1960      MRN: [id number]  Encounter Provider: Rosalie Connolly MD  Encounter Date: 2024   Encounter department: [facility]    Assessment & Plan   1. Severe recurrent major depression without psychotic features (HCC)  Assessment & Plan:  Stable on current meds    Orders:  -     venlafaxine (EFFEXOR-XR) 75 mg 24 hr capsule; Take 3 capsules (225 mg total) by mouth daily  2. Lung cancer screening declined by patient  3. Tobacco use disorder  Assessment & Plan:  Not ready to quit yet  Counseling given today  4. Mixed hyperlipidemia  Assessment & Plan:  ASCVD risk 12.1%  Trial of atorvastatin   Orders:  -     atorvastatin (LIPITOR) 10 mg tablet; Take 1 tablet (10 mg total) by mouth daily  -     Lipid panel  -     Comprehensive metabolic panel  5. Osteopenia of multiple sites  Assessment & Plan:  Dexa scan done in 3/2024  Continue calcium and vitamin D           History of Present Illness     HPI  Here for follow up  Feels well overall  No side effects from the medications       Review of Systems   Constitutional: Negative.    HENT: Negative.     Eyes: Negative.    Respiratory: Negative.     Cardiovascular: Negative.    Gastrointestinal: Negative.    Endocrine: Negative.    Genitourinary: Negative.    Musculoskeletal: Negative.    Skin: Negative.    Allergic/Immunologic: Negative.    Neurological: Negative.    Hematological: Negative.    Psychiatric/Behavioral: Negative.       Past Medical History:   Diagnosis Date   • Anxiety    • Depression      Past Surgical History:   Procedure Laterality Date   • BREAST EXCISIONAL BIOPSY Left     benign   •  SECTION  1987     Family History   Problem Relation Age of Onset   • Hypertension Mother    • Hypertension Father    • Prostate cancer Father 50   • Heart attack Father 52   • No Known Problems Sister    • No Known Problems Sister    • No Known Problems Daughter    • Clotting disorder  "Maternal Grandmother    • No Known Problems Maternal Grandfather    • No Known Problems Paternal Grandmother    • No Known Problems Paternal Grandfather    • Heart disease Brother    • Cancer Brother         spleen   • Breast cancer Maternal Aunt 60   • Pulmonary embolism Maternal Aunt    • No Known Problems Maternal Aunt    • No Known Problems Maternal Aunt    • No Known Problems Maternal Aunt    • Arthritis Family    • Heart disease Family    • Hypertension Family    • Heart disease Paternal Aunt    • No Known Problems Paternal Aunt    • No Known Problems Paternal Aunt      Social History     Tobacco Use   • Smoking status: Some Days     Current packs/day: 0.25     Average packs/day: 0.3 packs/day for 25.4 years (6.4 ttl pk-yrs)     Types: Cigarettes     Start date: 1999   • Smokeless tobacco: Never   Vaping Use   • Vaping status: Never Used   Substance and Sexual Activity   • Alcohol use: Not Currently     Comment: None   • Drug use: No   • Sexual activity: Not Currently     Partners: Female     Birth control/protection: Post-menopausal     Current Outpatient Medications on File Prior to Visit   Medication Sig   • clonazePAM (KlonoPIN) 0.5 mg tablet Take 0.5 mg by mouth daily as needed   • [DISCONTINUED] venlafaxine (EFFEXOR-XR) 75 mg 24 hr capsule Take 75 mg by mouth   • fluocinolone (SYNALAR) 0.01 % external solution Apply topically 2 (two) times a day (Patient not taking: Reported on 6/5/2024)     No Known Allergies  Immunization History   Administered Date(s) Administered   • COVID-19 J&J ([place]ino) vaccine 0.5 mL 11/11/2021, 02/26/2022   • INFLUENZA 02/02/2017, 02/02/2017, 10/06/2018, 11/11/2021     Objective     /80 (BP Location: Left arm, Patient Position: Sitting, Cuff Size: Standard)   Pulse 83   Temp (!) 97.3 °F (36.3 °C) (Tympanic)   Resp 17   Ht 5' 4.02\" (1.626 m)   Wt 77.1 kg (170 lb)   SpO2 98%   BMI 29.16 kg/m²     Physical Exam  Vitals and nursing note reviewed.   Constitutional:       " Appearance: Normal appearance.   Cardiovascular:      Rate and Rhythm: Normal rate and regular rhythm.      Pulses: Normal pulses.      Heart sounds: Normal heart sounds.   Pulmonary:      Effort: Pulmonary effort is normal.      Breath sounds: Normal breath sounds.   Neurological:      General: No focal deficit present.      Mental Status: She is alert.   Psychiatric:         Mood and Affect: Mood normal.         Behavior: Behavior normal.

## 2024-06-05 NOTE — ASSESSMENT & PLAN NOTE
Not ready to quit yet  Counseling given today   post procedure cxr with stable right effusion and no pneumothorax.

## 2024-10-09 ENCOUNTER — OFFICE VISIT (OUTPATIENT)
Dept: FAMILY MEDICINE CLINIC | Facility: CLINIC | Age: 64
End: 2024-10-09
Payer: COMMERCIAL

## 2024-10-09 VITALS
RESPIRATION RATE: 17 BRPM | WEIGHT: 171 LBS | DIASTOLIC BLOOD PRESSURE: 80 MMHG | SYSTOLIC BLOOD PRESSURE: 120 MMHG | TEMPERATURE: 98.7 F | HEART RATE: 88 BPM | HEIGHT: 64 IN | BODY MASS INDEX: 29.19 KG/M2 | OXYGEN SATURATION: 98 %

## 2024-10-09 DIAGNOSIS — H60.543 ECZEMA OF BOTH EXTERNAL EARS: ICD-10-CM

## 2024-10-09 DIAGNOSIS — E78.2 MIXED HYPERLIPIDEMIA: ICD-10-CM

## 2024-10-09 DIAGNOSIS — F33.2 SEVERE RECURRENT MAJOR DEPRESSION WITHOUT PSYCHOTIC FEATURES (HCC): Primary | ICD-10-CM

## 2024-10-09 DIAGNOSIS — Z12.31 VISIT FOR SCREENING MAMMOGRAM: ICD-10-CM

## 2024-10-09 DIAGNOSIS — M85.89 OSTEOPENIA OF MULTIPLE SITES: ICD-10-CM

## 2024-10-09 PROCEDURE — 99214 OFFICE O/P EST MOD 30 MIN: CPT | Performed by: FAMILY MEDICINE

## 2024-10-09 RX ORDER — CLOBETASOL PROPIONATE 0.5 MG/G
CREAM TOPICAL 2 TIMES DAILY
Qty: 60 G | Refills: 0 | Status: SHIPPED | OUTPATIENT
Start: 2024-10-09 | End: 2024-10-10 | Stop reason: SDUPTHER

## 2024-10-09 NOTE — ASSESSMENT & PLAN NOTE
Fluocinolone was $150   Will try clobetasol cream instead    Orders:    clobetasol (TEMOVATE) 0.05 % cream; Apply topically 2 (two) times a day    Ambulatory Referral to Dermatology; Future     Triaged by RN, appt. Scheduled with Dr. Leonard

## 2024-10-09 NOTE — ASSESSMENT & PLAN NOTE
Didn't do labs  Due for labs   Continue lipitor as directed    Orders:    Lipid panel    Comprehensive metabolic panel    CBC and differential

## 2024-10-09 NOTE — PROGRESS NOTES
Ambulatory Visit  Name: Sharon Olsen      : 1960      MRN: [id number]  Encounter Provider: Rosalie Connolly MD  Encounter Date: 10/9/2024   Encounter department: [facility]    Assessment & Plan  Severe recurrent major depression without psychotic features (HCC)  Doing well on current meds         Eczema of both external ears  Fluocinolone was $150   Will try clobetasol cream instead    Orders:    clobetasol (TEMOVATE) 0.05 % cream; Apply topically 2 (two) times a day    Ambulatory Referral to Dermatology; Future    Mixed hyperlipidemia  Didn't do labs  Due for labs   Continue lipitor as directed    Orders:    Lipid panel    Comprehensive metabolic panel    CBC and differential    Osteopenia of multiple sites  To continue calcium and vitamin D         Visit for screening mammogram    Orders:    Mammo screening bilateral w 3d and cad; Future         History of Present Illness     HPI  Here for follow up  Continued to have rash behind her ears  Feels well overall      Review of Systems   Constitutional: Negative.    HENT: Negative.     Respiratory: Negative.     Cardiovascular: Negative.    Gastrointestinal: Negative.    Genitourinary: Negative.    Musculoskeletal: Negative.    Skin:  Positive for rash.   Neurological: Negative.    Hematological: Negative.    Psychiatric/Behavioral: Negative.       Past Medical History:   Diagnosis Date    Anxiety     Depression      Past Surgical History:   Procedure Laterality Date    BREAST EXCISIONAL BIOPSY Left 1988    benign     SECTION  1987     Family History   Problem Relation Age of Onset    Hypertension Mother     Hypertension Father     Prostate cancer Father 50    Heart attack Father 52    No Known Problems Sister     No Known Problems Sister     No Known Problems Daughter     Clotting disorder Maternal Grandmother     No Known Problems Maternal Grandfather     No Known Problems Paternal Grandmother     No Known Problems Paternal  "Grandfather     Heart disease Brother     Cancer Brother         spleen    Breast cancer Maternal Aunt 60    Pulmonary embolism Maternal Aunt     No Known Problems Maternal Aunt     No Known Problems Maternal Aunt     No Known Problems Maternal Aunt     Arthritis Family     Heart disease Family     Hypertension Family     Heart disease Paternal Aunt     No Known Problems Paternal Aunt     No Known Problems Paternal Aunt      Social History     Tobacco Use    Smoking status: Some Days     Current packs/day: 0.50     Average packs/day: 0.5 packs/day for 25.8 years (12.9 ttl pk-yrs)     Types: Cigarettes     Start date: 1999     Passive exposure: Current    Smokeless tobacco: Never   Vaping Use    Vaping status: Never Used   Substance and Sexual Activity    Alcohol use: Not Currently     Comment: None    Drug use: No    Sexual activity: Not Currently     Partners: Female     Birth control/protection: Post-menopausal     Current Outpatient Medications on File Prior to Visit   Medication Sig    atorvastatin (LIPITOR) 10 mg tablet Take 1 tablet (10 mg total) by mouth daily    clonazePAM (KlonoPIN) 0.5 mg tablet Take 0.5 mg by mouth daily as needed    venlafaxine (EFFEXOR-XR) 75 mg 24 hr capsule Take 3 capsules (225 mg total) by mouth daily    [DISCONTINUED] fluocinolone (SYNALAR) 0.01 % external solution Apply topically 2 (two) times a day     No Known Allergies  Immunization History   Administered Date(s) Administered    COVID-19 J&J ([place]) vaccine 0.5 mL 11/11/2021, 02/26/2022    INFLUENZA 02/02/2017, 02/02/2017, 10/06/2018, 11/11/2021     Objective     /80 (BP Location: Left arm, Patient Position: Sitting, Cuff Size: Standard)   Pulse 88   Temp 98.7 °F (37.1 °C) (Tympanic)   Resp 17   Ht 5' 4.02\" (1.626 m)   Wt 77.6 kg (171 lb)   SpO2 98%   BMI 29.33 kg/m²     Physical Exam  Vitals and nursing note reviewed.   Constitutional:       Appearance: Normal appearance.   Cardiovascular:      Rate and Rhythm: " Normal rate and regular rhythm.      Pulses: Normal pulses.      Heart sounds: Normal heart sounds.   Pulmonary:      Effort: Pulmonary effort is normal.      Breath sounds: Normal breath sounds.   Skin:     Findings: Rash present.   Neurological:      General: No focal deficit present.      Mental Status: She is alert and oriented to person, place, and time.   Psychiatric:         Mood and Affect: Mood normal.         Behavior: Behavior normal.

## 2024-10-10 DIAGNOSIS — H60.543 ECZEMA OF BOTH EXTERNAL EARS: ICD-10-CM

## 2024-10-10 RX ORDER — CLOBETASOL PROPIONATE 0.5 MG/G
CREAM TOPICAL 2 TIMES DAILY
Qty: 60 G | Refills: 0 | Status: SHIPPED | OUTPATIENT
Start: 2024-10-10

## 2025-04-14 ENCOUNTER — OFFICE VISIT (OUTPATIENT)
Dept: FAMILY MEDICINE CLINIC | Facility: CLINIC | Age: 65
End: 2025-04-14
Payer: COMMERCIAL

## 2025-04-14 ENCOUNTER — HOSPITAL ENCOUNTER (OUTPATIENT)
Dept: RADIOLOGY | Facility: HOSPITAL | Age: 65
Discharge: HOME | End: 2025-04-14
Payer: COMMERCIAL

## 2025-04-14 VITALS
DIASTOLIC BLOOD PRESSURE: 80 MMHG | HEIGHT: 64 IN | SYSTOLIC BLOOD PRESSURE: 138 MMHG | BODY MASS INDEX: 29.71 KG/M2 | WEIGHT: 174 LBS | HEART RATE: 77 BPM | RESPIRATION RATE: 17 BRPM | OXYGEN SATURATION: 98 % | TEMPERATURE: 98.1 F

## 2025-04-14 DIAGNOSIS — M25.551 RIGHT HIP PAIN: Primary | ICD-10-CM

## 2025-04-14 DIAGNOSIS — F33.2 SEVERE RECURRENT MAJOR DEPRESSION WITHOUT PSYCHOTIC FEATURES (HCC): ICD-10-CM

## 2025-04-14 DIAGNOSIS — Z12.31 ENCOUNTER FOR SCREENING MAMMOGRAM FOR BREAST CANCER: ICD-10-CM

## 2025-04-14 DIAGNOSIS — M25.551 RIGHT HIP PAIN: ICD-10-CM

## 2025-04-14 PROCEDURE — 99213 OFFICE O/P EST LOW 20 MIN: CPT | Performed by: FAMILY MEDICINE

## 2025-04-14 PROCEDURE — 73502 X-RAY EXAM HIP UNI 2-3 VIEWS: CPT

## 2025-04-14 RX ORDER — METHYLPREDNISOLONE 4 MG/1
TABLET ORAL
Qty: 21 EACH | Refills: 0 | Status: SHIPPED | OUTPATIENT
Start: 2025-04-14 | End: 2025-04-24 | Stop reason: ALTCHOICE

## 2025-04-14 RX ORDER — GABAPENTIN 100 MG/1
CAPSULE ORAL
Qty: 30 CAPSULE | Refills: 5 | Status: SHIPPED | OUTPATIENT
Start: 2025-04-14

## 2025-04-14 NOTE — PROGRESS NOTES
"Name: Sharon Olsen      : 1960      MRN: [id number]  Encounter Provider: Rosalie Connolly MD  Encounter Date: 2025   Encounter department: [facility]  :  Assessment & Plan  Right hip pain  Suspect Hip impingement     Orders:  •  XR hip/pelv 2-3 vws right if performed; Future  •  methylPREDNISolone 4 MG tablet therapy pack; Use as directed on package  •  gabapentin (NEURONTIN) 100 mg capsule; Take 1 tab at bedtime x 3 days then take 2 tabs at bedtime    Encounter for screening mammogram for breast cancer    Orders:  •  Mammo screening bilateral w 3d and cad; Future    Severe recurrent major depression without psychotic features (HCC)  Stable on effexor                 History of Present Illness     Hip Pain (Patient being seen for Right hip pain that radiates to right knee x 1 month)    Right hip pain radiating to anterior thigh and numbness around inner thigh      Hip Pain   There was no injury mechanism. The pain is present in the right hip. Associated symptoms include numbness. Pertinent negatives include no inability to bear weight, loss of motion, loss of sensation, muscle weakness or tingling. The symptoms are aggravated by movement and weight bearing.     Review of Systems   Constitutional: Negative.    HENT: Negative.     Musculoskeletal:  Positive for arthralgias. Negative for joint swelling.   Neurological:  Positive for numbness. Negative for tingling.       Objective   /80 (BP Location: Left arm, Patient Position: Sitting, Cuff Size: Standard)   Pulse 77   Temp 98.1 °F (36.7 °C) (Tympanic)   Resp 17   Ht 5' 4.02\" (1.626 m)   Wt 78.9 kg (174 lb)   SpO2 98%   BMI 29.85 kg/m²      Physical Exam  Constitutional:       Appearance: Normal appearance.   Cardiovascular:      Rate and Rhythm: Normal rate and regular rhythm.      Pulses: Normal pulses.      Heart sounds: Normal heart sounds.   Pulmonary:      Effort: Pulmonary effort is normal.      Breath sounds: " Normal breath sounds.   Musculoskeletal:      Right lower leg: No edema.      Left lower leg: No edema.      Comments: Tenderness on right hip on hip flexion and extension    Neurological:      Mental Status: She is alert.

## 2025-04-17 ENCOUNTER — RESULTS FOLLOW-UP (OUTPATIENT)
Dept: FAMILY MEDICINE CLINIC | Facility: CLINIC | Age: 65
End: 2025-04-17

## 2025-04-24 ENCOUNTER — OFFICE VISIT (OUTPATIENT)
Dept: FAMILY MEDICINE CLINIC | Facility: CLINIC | Age: 65
End: 2025-04-24
Payer: COMMERCIAL

## 2025-04-24 VITALS
BODY MASS INDEX: 30.05 KG/M2 | RESPIRATION RATE: 18 BRPM | WEIGHT: 176 LBS | HEIGHT: 64 IN | TEMPERATURE: 99.3 F | SYSTOLIC BLOOD PRESSURE: 130 MMHG | OXYGEN SATURATION: 99 % | DIASTOLIC BLOOD PRESSURE: 86 MMHG | HEART RATE: 98 BPM

## 2025-04-24 DIAGNOSIS — Z00.00 ANNUAL PHYSICAL EXAM: Primary | ICD-10-CM

## 2025-04-24 DIAGNOSIS — M85.89 OSTEOPENIA OF MULTIPLE SITES: ICD-10-CM

## 2025-04-24 DIAGNOSIS — Z53.20 LUNG CANCER SCREENING DECLINED BY PATIENT: ICD-10-CM

## 2025-04-24 DIAGNOSIS — E78.2 MIXED HYPERLIPIDEMIA: ICD-10-CM

## 2025-04-24 PROCEDURE — 99397 PER PM REEVAL EST PAT 65+ YR: CPT | Performed by: FAMILY MEDICINE

## 2025-04-24 NOTE — PROGRESS NOTES
Adult Annual Physical  Name: Sharon Olsen      : 1960      MRN: [id number]  Encounter Provider: Rosalie Connolly MD  Encounter Date: 2025   Encounter department: [facility]    :  Assessment & Plan  Annual physical exam    Orders:  •  Comprehensive metabolic panel  •  CBC and differential  •  Hemoglobin A1C    Mixed hyperlipidemia  Due for labs  Ordered them again today  Orders:  •  Lipid panel    Osteopenia of multiple sites  Continue calcium, vitamin D and walking       Lung cancer screening declined by patient             Preventive Screenings:  - Diabetes Screening: orders placed  - Cholesterol Screening: screening not indicated and has hyperlipidemia   - Hepatitis C screening: screening up-to-date   - HIV screening: orders placed   - Cervical cancer screening: screening not indicated   - Breast cancer screening: screening up-to-date   - Colon cancer screening: orders placed   - Lung cancer screening: screening not indicated     BMI Counseling: Body mass index is 30.53 kg/m². The BMI is above normal. Exercise recommendations include exercising 3-5 times per week.     Depression Screening and Follow-up Plan: Patient's depression screening was positive with a PHQ-9 score of 6.   Patient with underlying depression and was advised to continue current medications as prescribed.     Tobacco Cessation Counseling: Tobacco cessation counseling was provided. The patient is sincerely urged to quit consumption of tobacco. She is not ready to quit tobacco. Medication options and side effects of medication discussed. Patient refused medication.       History of Present Illness     Adult Annual Physical:  Patient presents for annual physical.     Diet and Physical Activity:  - Diet/Nutrition: no special diet and frequent junk food.  - Exercise: walking, 5-7 times a week on average and less than 30 minutes on average.    Depression Screening:    - PHQ-9 Score: 6    General Health:  - Sleep:  sleeps well and 4-6 hours of sleep on average.  - Hearing: normal hearing bilateral ears.  - Vision: vision problems, most recent eye exam > 1 year ago and wears glasses.  - Dental: regular dental visits, brushes teeth twice daily and floss regularly.    /GYN Health:  - Follows with GYN: yes.   - Menopause: postmenopausal.   - History of STDs: no  - Contraception: menopause.      Advanced Care Planning:  - Has an advanced directive?: no    - Has a durable medical POA?: no    - ACP document given to patient?: no      Review of Systems   Constitutional: Negative.    HENT: Negative.     Respiratory: Negative.     Cardiovascular: Negative.    Endocrine: Negative.    Genitourinary: Negative.    Musculoskeletal:  Positive for back pain.   Allergic/Immunologic: Negative.    Neurological: Negative.    Hematological: Negative.    Psychiatric/Behavioral: Negative.       Medical History Reviewed by provider this encounter:  Tobacco  Meds  Problems  Soc Hx    .  Past Medical History   Past Medical History:   Diagnosis Date   • Anxiety    • Depression      Past Surgical History:   Procedure Laterality Date   • BREAST EXCISIONAL BIOPSY Left     benign   •  SECTION  1987     Family History   Problem Relation Age of Onset   • Hypertension Mother    • Hypertension Father    • Prostate cancer Father 50   • Heart attack Father 52   • No Known Problems Sister    • No Known Problems Sister    • No Known Problems Daughter    • Clotting disorder Maternal Grandmother    • No Known Problems Maternal Grandfather    • No Known Problems Paternal Grandmother    • No Known Problems Paternal Grandfather    • Heart disease Brother    • Cancer Brother         spleen   • Breast cancer Maternal Aunt 60   • Pulmonary embolism Maternal Aunt    • No Known Problems Maternal Aunt    • No Known Problems Maternal Aunt    • No Known Problems Maternal Aunt    • Arthritis Family    • Heart disease Family    • Hypertension Family    •  Heart disease Paternal Aunt    • No Known Problems Paternal Aunt    • No Known Problems Paternal Aunt       reports that she has been smoking cigarettes. She started smoking about 26 years ago. She has a 13.2 pack-year smoking history. She has been exposed to tobacco smoke. She has never used smokeless tobacco. She reports that she does not currently use alcohol. She reports that she does not use drugs.  Current Outpatient Medications   Medication Instructions   • atorvastatin (LIPITOR) 10 mg, Oral, Daily   • clobetasol (TEMOVATE) 0.05 % cream Topical, 2 times daily   • gabapentin (NEURONTIN) 100 mg capsule Take 1 tab at bedtime x 3 days then take 2 tabs at bedtime   • venlafaxine (EFFEXOR-XR) 225 mg, Oral, Daily   No Known Allergies   Current Outpatient Medications on File Prior to Visit   Medication Sig Dispense Refill   • atorvastatin (LIPITOR) 10 mg tablet Take 1 tablet (10 mg total) by mouth daily 100 tablet 3   • clobetasol (TEMOVATE) 0.05 % cream Apply topically 2 (two) times a day 60 g 0   • gabapentin (NEURONTIN) 100 mg capsule Take 1 tab at bedtime x 3 days then take 2 tabs at bedtime 30 capsule 5   • venlafaxine (EFFEXOR-XR) 75 mg 24 hr capsule Take 3 capsules (225 mg total) by mouth daily 270 capsule 3   • [DISCONTINUED] clonazePAM (KlonoPIN) 0.5 mg tablet Take 0.5 mg by mouth daily as needed     • [DISCONTINUED] methylPREDNISolone 4 MG tablet therapy pack Use as directed on package (Patient not taking: Reported on 4/24/2025) 21 each 0     No current facility-administered medications on file prior to visit.      Social History     Tobacco Use   • Smoking status: Some Days     Current packs/day: 0.50     Average packs/day: 0.5 packs/day for 26.3 years (13.2 ttl pk-yrs)     Types: Cigarettes     Start date: 1999     Passive exposure: Current   • Smokeless tobacco: Never   Vaping Use   • Vaping status: Never Used   Substance and Sexual Activity   • Alcohol use: Not Currently     Comment: None   • Drug use:  "No   • Sexual activity: Not Currently     Partners: Female     Birth control/protection: Post-menopausal       Objective   /86 (BP Location: Left arm, Patient Position: Sitting, Cuff Size: Standard)   Pulse 98   Temp 99.3 °F (37.4 °C) (Tympanic)   Resp 18   Ht 5' 3.66\" (1.617 m)   Wt 79.8 kg (176 lb)   SpO2 99%   BMI 30.53 kg/m²     Physical Exam  Vitals and nursing note reviewed.   Constitutional:       Appearance: Normal appearance. She is well-developed.   HENT:      Head: Normocephalic and atraumatic.      Right Ear: Tympanic membrane normal.      Left Ear: Tympanic membrane normal.      Nose: Nose normal.      Mouth/Throat:      Mouth: Mucous membranes are moist.   Eyes:      Pupils: Pupils are equal, round, and reactive to light.   Neck:      Thyroid: No thyromegaly.   Cardiovascular:      Rate and Rhythm: Normal rate and regular rhythm.      Pulses: Normal pulses.      Heart sounds: Normal heart sounds. No murmur heard.  Pulmonary:      Effort: Pulmonary effort is normal.      Breath sounds: Normal breath sounds.   Abdominal:      General: Bowel sounds are normal.      Palpations: Abdomen is soft.   Musculoskeletal:         General: No deformity. Normal range of motion.      Cervical back: Normal range of motion and neck supple.   Skin:     General: Skin is warm.      Findings: No erythema or rash.   Neurological:      General: No focal deficit present.      Mental Status: She is alert and oriented to person, place, and time.      Deep Tendon Reflexes: Reflexes are normal and symmetric.   Psychiatric:         Mood and Affect: Mood normal.         Behavior: Behavior normal.   BMI Counseling: Body mass index is 30.53 kg/m². The BMI is above normal. Nutrition recommendations include reducing portion sizes, decreasing overall calorie intake, reducing fast food intake, and consuming healthier snacks. Exercise recommendations include moderate aerobic physical activity for 150 minutes/week.     "

## 2025-04-24 NOTE — PATIENT INSTRUCTIONS
"Patient Education     Routine physical for adults   The Basics   Written by the doctors and editors at [facility]   What is a physical? -- A physical is a routine visit, or \"check-up,\" with your doctor. You might also hear it called a \"wellness visit\" or \"preventive visit.\"  During each visit, the doctor will:   Ask about your physical and mental health   Ask about your habits, behaviors, and lifestyle   Do an exam   Give you vaccines if needed   Talk to you about any medicines you take   Give advice about your health   Answer your questions  Getting regular check-ups is an important part of taking care of your health. It can help your doctor find and treat any problems you have. But it's also important for preventing health problems.  A routine physical is different from a \"sick visit.\" A sick visit is when you see a doctor because of a health concern or problem. Since physicals are scheduled ahead of time, you can think about what you want to ask the doctor.  How often should I get a physical? -- It depends on your age and health. In general, for people age 21 years and older:   If you are younger than 50 years, you might be able to get a physical every 3 years.   If you are 50 years or older, your doctor might recommend a physical every year.  If you have an ongoing health condition, like diabetes or high blood pressure, your doctor will probably want to see you more often.  What happens during a physical? -- In general, each visit will include:   Physical exam - The doctor or nurse will check your height, weight, heart rate, and blood pressure. They will also look at your eyes and ears. They will ask about how you are feeling and whether you have any symptoms that bother you.   Medicines - It's a good idea to bring a list of all the medicines you take to each doctor visit. Your doctor will talk to you about your medicines and answer any questions. Tell them if you are having any side effects that bother you. You " "should also tell them if you are having trouble paying for any of your medicines.   Habits and behaviors - This includes:   Your diet   Your exercise habits   Whether you smoke, drink alcohol, or use drugs   Whether you are sexually active   Whether you feel safe at home  Your doctor will talk to you about things you can do to improve your health and lower your risk of health problems. They will also offer help and support. For example, if you want to quit smoking, they can give you advice and might prescribe medicines. If you want to improve your diet or get more physical activity, they can help you with this, too.   Lab tests, if needed - The tests you get will depend on your age and situation. For example, your doctor might want to check your:   Cholesterol   Blood sugar   Iron level   Vaccines - The recommended vaccines will depend on your age, health, and what vaccines you already had. Vaccines are very important because they can prevent certain serious or deadly infections.   Discussion of screening - \"Screening\" means checking for diseases or other health problems before they cause symptoms. Your doctor can recommend screening based on your age, risk, and preferences. This might include tests to check for:   Cancer, such as breast, prostate, cervical, ovarian, colorectal, prostate, lung, or skin cancer   Sexually transmitted infections, such as chlamydia and gonorrhea   Mental health conditions like depression and anxiety  Your doctor will talk to you about the different types of screening tests. They can help you decide which screenings to have. They can also explain what the results might mean.   Answering questions - The physical is a good time to ask the doctor or nurse questions about your health. If needed, they can refer you to other doctors or specialists, too.  Adults older than 65 years often need other care, too. As you get older, your doctor will talk to you about:   How to prevent falling at " home   Hearing or vision tests   Memory testing   How to take your medicines safely   Making sure that you have the help and support you need at home  All topics are updated as new evidence becomes available and our peer review process is complete.  This topic retrieved from [employer] on: May 02, 2024.  Topic 867365 Version 1.0  Release: 32.4.3 - C32.122  © 2024 UpToDate, Inc. and/or its affiliates. All rights reserved.  Consumer Information Use and Disclaimer   Disclaimer: This generalized information is a limited summary of diagnosis, treatment, and/or medication information. It is not meant to be comprehensive and should be used as a tool to help the user understand and/or assess potential diagnostic and treatment options. It does NOT include all information about conditions, treatments, medications, side effects, or risks that may apply to a specific patient. It is not intended to be medical advice or a substitute for the medical advice, diagnosis, or treatment of a health care provider based on the health care provider's examination and assessment of a patient's specific and unique circumstances. Patients must speak with a health care provider for complete information about their health, medical questions, and treatment options, including any risks or benefits regarding use of medications. This information does not endorse any treatments or medications as safe, effective, or approved for treating a specific patient. UpToDate, Inc. and its affiliates disclaim any warranty or liability relating to this information or the use thereof.The use of this information is governed by the Terms of Use, available at https://www.[employer] Biosciencesuwer.com/en/know/clinical-effectiveness-terms. 2024© UpToDate, Inc. and its affiliates and/or licensors. All rights reserved.  Copyright   © 2024 UpToDate, Inc. and/or its affiliates. All rights reserved.

## 2025-06-26 ENCOUNTER — OFFICE VISIT (OUTPATIENT)
Dept: FAMILY MEDICINE CLINIC | Facility: CLINIC | Age: 65
End: 2025-06-26
Payer: COMMERCIAL

## 2025-06-26 ENCOUNTER — TELEPHONE (OUTPATIENT)
Dept: FAMILY MEDICINE CLINIC | Facility: CLINIC | Age: 65
End: 2025-06-26

## 2025-06-26 VITALS
TEMPERATURE: 95.1 F | HEART RATE: 102 BPM | BODY MASS INDEX: 29.37 KG/M2 | WEIGHT: 172 LBS | HEIGHT: 64 IN | DIASTOLIC BLOOD PRESSURE: 82 MMHG | OXYGEN SATURATION: 96 % | RESPIRATION RATE: 18 BRPM | SYSTOLIC BLOOD PRESSURE: 126 MMHG

## 2025-06-26 DIAGNOSIS — S70.362A INSECT BITE OF LEFT THIGH, INITIAL ENCOUNTER: ICD-10-CM

## 2025-06-26 DIAGNOSIS — R51.9 ACUTE NONINTRACTABLE HEADACHE, UNSPECIFIED HEADACHE TYPE: ICD-10-CM

## 2025-06-26 DIAGNOSIS — L03.116 CELLULITIS OF LEG, LEFT: Primary | ICD-10-CM
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
## 2025-06-26 DIAGNOSIS — M54.2 NECK PAIN ON LEFT SIDE: ICD-10-CM

## 2025-06-26 PROCEDURE — 99214 OFFICE O/P EST MOD 30 MIN: CPT | Performed by: FAMILY MEDICINE

## 2025-06-26 RX ORDER — DOXYCYCLINE HYCLATE 100 MG
100 TABLET ORAL 2 TIMES DAILY
Qty: 14 TABLET | Refills: 0 | Status: SHIPPED | OUTPATIENT
Start: 2025-06-26 | End: 2025-07-03

## 2025-06-26 NOTE — PROGRESS NOTES
Name: Sharon Olsen      : 1960      MRN: [id number]  Encounter Provider: Daniela Veloz MD  Encounter Date: 2025   Encounter department: [facility]  :              Assessment & Plan  Cellulitis of leg, left  Presents with a large rash on the left posterior knee following an insect bite. Unsure what bit her but thought it was a mosquito. Area is pruritic and has been applying topical steroids on the area. No drainage. Has removed ticks off of her in the past few weeks and [place]. Also developed headaches and neck pain. Suspect cellulitis although not warm vs lyme EM. Will treat with doxy which would address both. Get labs prior to next appt. Call precautions   Orders:    doxycycline hyclate (VIBRA-TABS) 100 mg tablet; Take 1 tablet (100 mg total) by mouth 2 (two) times a day for 7 days    Lyme Total AB W Reflex to IGM/IGG; Future    Insect bite of left thigh, initial encounter    Orders:    Lyme Total AB W Reflex to IGM/IGG; Future    Neck pain on left side  New. Possibly due to poor neck positioning at night. Does sleep on the couch without a pillow.        Acute nonintractable headache, unspecified headache type  New. No focal neurological findings. Nausea reported but no vomiting or aura. Photophobia is chronic. Possibly related to possible lyme vs cervicalgia from poor neck position at night                History of Present Illness   Presents with a rash behind the left knee  She noticed it 6 days ago and thought she was bitten by a mosquito. She applied topical steroids to help relieve the itching. Over the next few days, the rash got bigger and continued to itch. No drainage or fevers. Did recently develop left sided neck pain and headache which is new.   Moved [place] densely [place] area last year and did removed 2 ticks from her 1-2 weeks ago.       Review of Systems   Constitutional:  Negative for fatigue and fever.   Respiratory: Negative.    "  Cardiovascular:  Positive for leg swelling. Negative for chest pain and palpitations.   Gastrointestinal: Negative.    Musculoskeletal:  Positive for neck pain and neck stiffness.   Skin:  Positive for rash.   Neurological:  Positive for headaches.       Objective   /82 (BP Location: Left arm, Patient Position: Sitting, Cuff Size: Standard)   Pulse 102   Temp (!) 95.1 °F (35.1 °C) (Tympanic)   Resp 18   Ht 5' 3.66\" (1.617 m)   Wt 78 kg (172 lb)   SpO2 96%   BMI 29.84 kg/m²      Physical Exam  Vitals reviewed.   Constitutional:       Appearance: Normal appearance.   Neck:     Pulmonary:      Effort: Pulmonary effort is normal.     Musculoskeletal:      Cervical back: Pain with movement and muscular tenderness present.     Skin:     Findings: Rash (left posterior knee. See photos) present.     Neurological:      Mental Status: She is alert.     Psychiatric:         Mood and Affect: Mood normal.         Behavior: Behavior normal.         Thought Content: Thought content normal.         "

## 2025-07-09 LAB
ALBUMIN SERPL-MCNC: 4.5 G/DL (ref 3.6–5.1)
ALBUMIN/GLOB SERPL: 1.9 (CALC) (ref 1–2.5)
ALP SERPL-CCNC: 111 U/L (ref 37–153)
ALT SERPL-CCNC: 13 U/L (ref 6–29)
AST SERPL-CCNC: 13 U/L (ref 10–35)
BASOPHILS # BLD AUTO: 122 CELLS/UL (ref 0–200)
BASOPHILS NFR BLD AUTO: 1.8 %
BILIRUB SERPL-MCNC: 0.6 MG/DL (ref 0.2–1.2)
BUN SERPL-MCNC: 13 MG/DL (ref 7–25)
BUN/CREAT SERPL: ABNORMAL (CALC) (ref 6–22)
CALCIUM SERPL-MCNC: 9.6 MG/DL (ref 8.6–10.4)
CHLORIDE SERPL-SCNC: 105 MMOL/L (ref 98–110)
CHOLEST SERPL-MCNC: 259 MG/DL
CHOLEST/HDLC SERPL: 4.7 (CALC)
CO2 SERPL-SCNC: 29 MMOL/L (ref 20–32)
CREAT SERPL-MCNC: 0.77 MG/DL (ref 0.5–1.05)
EOSINOPHIL # BLD AUTO: 156 CELLS/UL (ref 15–500)
EOSINOPHIL NFR BLD AUTO: 2.3 %
ERYTHROCYTE [DISTWIDTH] IN BLOOD BY AUTOMATED COUNT: 12.8 % (ref 11–15)
GFR/BSA.PRED SERPLBLD CYS-BASED-ARV: 85 ML/MIN/1.73M2
GLOBULIN SER CALC-MCNC: 2.4 G/DL (CALC) (ref 1.9–3.7)
GLUCOSE SERPL-MCNC: 108 MG/DL (ref 65–99)
HBA1C MFR BLD: 5.6 %
HCT VFR BLD AUTO: 46.3 % (ref 35–45)
HDLC SERPL-MCNC: 55 MG/DL
HGB BLD-MCNC: 15.5 G/DL (ref 11.7–15.5)
LDLC SERPL CALC-MCNC: 163 MG/DL (CALC)
LYMPHOCYTES # BLD AUTO: 1265 CELLS/UL (ref 850–3900)
LYMPHOCYTES NFR BLD AUTO: 18.6 %
MCH RBC QN AUTO: 30.5 PG (ref 27–33)
MCHC RBC AUTO-ENTMCNC: 33.5 G/DL (ref 32–36)
MCV RBC AUTO: 91.1 FL (ref 80–100)
MONOCYTES # BLD AUTO: 435 CELLS/UL (ref 200–950)
MONOCYTES NFR BLD AUTO: 6.4 %
NEUTROPHILS # BLD AUTO: 4821 CELLS/UL (ref 1500–7800)
NEUTROPHILS NFR BLD AUTO: 70.9 %
NONHDLC SERPL-MCNC: 204 MG/DL (CALC)
PLATELET # BLD AUTO: 260 THOUSAND/UL (ref 140–400)
PMV BLD REES-ECKER: 11.8 FL (ref 7.5–12.5)
POTASSIUM SERPL-SCNC: 4.1 MMOL/L (ref 3.5–5.3)
PROT SERPL-MCNC: 6.9 G/DL (ref 6.1–8.1)
RBC # BLD AUTO: 5.08 MILLION/UL (ref 3.8–5.1)
SODIUM SERPL-SCNC: 139 MMOL/L (ref 135–146)
TRIGL SERPL-MCNC: 243 MG/DL
WBC # BLD AUTO: 6.8 THOUSAND/UL (ref 3.8–10.8)

## 2025-07-11 LAB
B BURGDOR IGG SER IA-ACNC: <=0.9 INDEX
B BURGDOR IGG+IGM SER QL IA: 1.55 INDEX
B BURGDOR IGM SER IA-ACNC: 2.84 INDEX

## 2025-07-14 ENCOUNTER — PATIENT MESSAGE (OUTPATIENT)
Dept: FAMILY MEDICINE CLINIC | Facility: CLINIC | Age: 65
End: 2025-07-14

## 2025-07-14 DIAGNOSIS — A69.20 LYME DISEASE: ICD-10-CM

## 2025-07-14 RX ORDER — DOXYCYCLINE HYCLATE 100 MG
100 TABLET ORAL 2 TIMES DAILY
Qty: 6 TABLET | Refills: 0 | Status: SHIPPED | OUTPATIENT
Start: 2025-07-14 | End: 2025-07-16 | Stop reason: SDUPTHER

## 2025-07-14 NOTE — TELEPHONE ENCOUNTER
Patient requesting prescription for doxycycline hyclate (VIBRA-TABS) 100 mg tablet be resent to [facility] in [place]. RN addressed as per patient request and protocol. Patient is also concerned because she wants to make sure this will cover her for the 3 day gap in between. States she took the first 7 day course but has been without antibiotics for the past 3 days. Will she still be covered or is there something additional she should do? Please reach out  to patient with provider response.

## 2025-07-16 DIAGNOSIS — A69.20 LYME DISEASE: ICD-10-CM

## 2025-07-16 RX ORDER — DOXYCYCLINE HYCLATE 100 MG
100 TABLET ORAL 2 TIMES DAILY
Qty: 20 TABLET | Refills: 0 | Status: SHIPPED | OUTPATIENT
Start: 2025-07-16 | End: 2025-07-26